# Patient Record
Sex: MALE | Race: WHITE | Employment: FULL TIME | URBAN - METROPOLITAN AREA
[De-identification: names, ages, dates, MRNs, and addresses within clinical notes are randomized per-mention and may not be internally consistent; named-entity substitution may affect disease eponyms.]

---

## 2023-06-08 ENCOUNTER — HOSPITAL ENCOUNTER (EMERGENCY)
Age: 38
Discharge: HOME | End: 2023-06-09
Attending: EMERGENCY MEDICINE

## 2023-06-08 VITALS
TEMPERATURE: 98.1 F | OXYGEN SATURATION: 99 % | SYSTOLIC BLOOD PRESSURE: 135 MMHG | HEART RATE: 65 BPM | RESPIRATION RATE: 16 BRPM | DIASTOLIC BLOOD PRESSURE: 78 MMHG

## 2023-06-08 DIAGNOSIS — F32.A DEPRESSION, UNSPECIFIED DEPRESSION TYPE: Primary | ICD-10-CM

## 2023-06-08 ASSESSMENT — PAIN - FUNCTIONAL ASSESSMENT: PAIN_FUNCTIONAL_ASSESSMENT: NONE - DENIES PAIN

## 2023-06-08 ASSESSMENT — SLEEP AND FATIGUE QUESTIONNAIRES
DO YOU USE A SLEEP AID: NO
DO YOU HAVE DIFFICULTY SLEEPING: NO
AVERAGE NUMBER OF SLEEP HOURS: 9

## 2023-06-09 RX ORDER — DESVENLAFAXINE 50 MG/1
50 TABLET, EXTENDED RELEASE ORAL DAILY
Qty: 30 TABLET | Refills: 0 | Status: SHIPPED | OUTPATIENT
Start: 2023-06-09 | End: 2023-06-09 | Stop reason: SDUPTHER

## 2023-06-09 RX ORDER — DESVENLAFAXINE 50 MG/1
50 TABLET, EXTENDED RELEASE ORAL DAILY
Qty: 30 TABLET | Refills: 0 | Status: SHIPPED | OUTPATIENT
Start: 2023-06-09

## 2023-06-09 NOTE — ED PROVIDER NOTES
video communication. He believes the patient is safe for discharge from psychiatry standpoint. No SI/HI. Requests outpatient script for desvenlafaxine 50mg qAM. Patient happy with this plan. All questions answered      [unfilled]    Is this patient to be included in the SEP-1 Core Measure due to severe sepsis or septic shock? No   Exclusion criteria - the patient is NOT to be included for SEP-1 Core Measure due to: Infection is not suspected        Final Impression  1. Depression, unspecified depression type        Blood pressure 135/78, pulse 65, temperature 98.1 °F (36.7 °C), temperature source Oral, resp. rate 16, SpO2 99 %. Disposition:  DISPOSITION Decision To Discharge 06/09/2023 01:06:51 AM      Patient Referrals:  [facility]) Pre-Services  [phone]          Discharge Medications:  New Prescriptions    DESVENLAFAXINE (KHEDEZLA) 50 MG TB24 EXTENDED RELEASE TABLET    Take 1 tablet by mouth daily       Discontinued Medications:  Discontinued Medications    No medications on file       This chart was generated using the [address] [place] [address] [employer].ination system. I created this record but it may contain dictation errors given the limitations of this technology.         Michael Cleveland MD  06/09/23 3999

## 2023-06-09 NOTE — VIRTUAL HEALTH
Patient is employed. Brief Summary: 40year old man who had his friend bring him to the hospital.  Patient has extreme thoughts of depression, hopelessness, paranoia and racing thoughts. Patient has suffered extreme trauma by losing his mother and 5 other family members primarily due to suicides. Patient stays with a friend and reports that his wife left him which seemed to break him. TC:  NP to discuss safety concerns for this patient and how he is feeling. Patient reports not being able to function and that this depression has been with him for over a month in which it will not go away. Patient is not sure that he experienced psychoses. Patient denies SI or HI. Disposition: Patient to be referred to psychiatry for further evaluation. Safety Plan: Plan pending completion of psychiatry evaluation. Marty Mason, was evaluated through a synchronous (real-time) audio-video encounter. The patient (and/or guardian if applicable) is aware that this is a billable service, which includes applicable co-pays. This virtual visit was conducted with patient's (and/or legal guardian's) consent. Patient identification was verified, and a caregiver was present when appropriate. The patient was located at [facility] [address]  [address]: [phone]  The provider was located at Home (City/State): [address]     Total time spent on this encounter: Not billed by time        --Reena Brooke on 6/8/2023 at 11:06 PM    An electronic signature was used to authenticate this note.
adequate   Abstraction:  abstract   Insight:  fair   Reliability fair   Judgment:  fair     No TD noted. AIMS= 0    No notes of this type exist for this encounter. Impression:   Depression        Plan:    Recommend starting Desvenlafaxine (generic Pristiq) 50mg PO QAM, 30 day supply to help patient until he can be connected with a primary care or psychiatric provider. RN to provide patient with resources for obtaining a Primary Care/Psychiatry provider. Patient encouraged to start outpatient therapy. Patient is ok to be dc from a psychiatric point of view when medically appropriate. Patient does not meet criteria for a psychiatric hold at this time  Medical co-morbidities: Management per medical providers, appreciate assistance  Legal Status: voluntary. Medical records, labs, diagnostic tests reviewed  Recommendations were discussed with ER physician Dr. Tesfaye Quijano. Pt had an opportunity to ask questions and address concerns. The patient verbalized understanding and agreed with the treatment plan as outlined above. Thank you for this consult. Collaborated with Dr. Trung Diaz for aspects of care for this patient. Brenda Harrison, was evaluated through a synchronous (real-time) audio-video encounter. The patient (and/or guardian if applicable) is aware that this is a billable service, which includes applicable co-pays. This virtual visit was conducted with patient's (and/or legal guardian's) consent. Patient identification was verified, and a caregiver was present when appropriate. The patient was located at [facility] (Appt Department): Mary [address]  [address]: [phone]  The provider was located at Home (City/State): [place]     Total time spent on this encounter: Not billed by time    --PREET Alberto CNP on 6/9/2023 at 12:00 AM    An electronic signature was used to authenticate this note.

## 2023-06-09 NOTE — DISCHARGE INSTRUCTIONS
Please return if you have any new, worsening, or concerning symptoms like inability to eat/drink/walk, fevers, suicidal thoughts